# Patient Record
Sex: MALE | Race: OTHER | NOT HISPANIC OR LATINO | Employment: STUDENT | URBAN - METROPOLITAN AREA
[De-identification: names, ages, dates, MRNs, and addresses within clinical notes are randomized per-mention and may not be internally consistent; named-entity substitution may affect disease eponyms.]

---

## 2024-08-28 PROCEDURE — 99283 EMERGENCY DEPT VISIT LOW MDM: CPT

## 2024-08-29 ENCOUNTER — HOSPITAL ENCOUNTER (INPATIENT)
Facility: HOSPITAL | Age: 24
LOS: 2 days | Discharge: HOME | DRG: 440 | End: 2024-08-31
Attending: EMERGENCY MEDICINE | Admitting: INTERNAL MEDICINE
Payer: COMMERCIAL

## 2024-08-29 ENCOUNTER — APPOINTMENT (OUTPATIENT)
Dept: RADIOLOGY | Facility: HOSPITAL | Age: 24
DRG: 440 | End: 2024-08-29
Payer: COMMERCIAL

## 2024-08-29 ENCOUNTER — HOSPITAL ENCOUNTER (EMERGENCY)
Facility: HOSPITAL | Age: 24
Discharge: HOME | DRG: 440 | End: 2024-08-29
Attending: EMERGENCY MEDICINE
Payer: COMMERCIAL

## 2024-08-29 VITALS
HEIGHT: 64 IN | SYSTOLIC BLOOD PRESSURE: 141 MMHG | OXYGEN SATURATION: 99 % | DIASTOLIC BLOOD PRESSURE: 93 MMHG | WEIGHT: 171.96 LBS | RESPIRATION RATE: 18 BRPM | TEMPERATURE: 97 F | BODY MASS INDEX: 29.36 KG/M2 | HEART RATE: 89 BPM

## 2024-08-29 DIAGNOSIS — R10.13 EPIGASTRIC PAIN: ICD-10-CM

## 2024-08-29 DIAGNOSIS — R11.2 NAUSEA AND VOMITING, UNSPECIFIED VOMITING TYPE: ICD-10-CM

## 2024-08-29 DIAGNOSIS — K85.00 IDIOPATHIC ACUTE PANCREATITIS WITHOUT INFECTION OR NECROSIS (HHS-HCC): Primary | ICD-10-CM

## 2024-08-29 DIAGNOSIS — R10.13 EPIGASTRIC PAIN: Primary | ICD-10-CM

## 2024-08-29 LAB
ALBUMIN SERPL BCP-MCNC: 4.4 G/DL (ref 3.4–5)
ALBUMIN SERPL BCP-MCNC: 4.5 G/DL (ref 3.4–5)
ALP SERPL-CCNC: 79 U/L (ref 33–120)
ALP SERPL-CCNC: 81 U/L (ref 33–120)
ALT SERPL W P-5'-P-CCNC: 167 U/L (ref 10–52)
ALT SERPL W P-5'-P-CCNC: 186 U/L (ref 10–52)
ANION GAP SERPL CALC-SCNC: 14 MMOL/L (ref 10–20)
ANION GAP SERPL CALC-SCNC: 15 MMOL/L (ref 10–20)
APTT PPP: 24 SECONDS (ref 27–38)
AST SERPL W P-5'-P-CCNC: 402 U/L (ref 9–39)
AST SERPL W P-5'-P-CCNC: 422 U/L (ref 9–39)
BASOPHILS # BLD AUTO: 0.01 X10*3/UL (ref 0–0.1)
BASOPHILS # BLD AUTO: 0.02 X10*3/UL (ref 0–0.1)
BASOPHILS NFR BLD AUTO: 0.1 %
BASOPHILS NFR BLD AUTO: 0.1 %
BILIRUB SERPL-MCNC: 0.7 MG/DL (ref 0–1.2)
BILIRUB SERPL-MCNC: 0.8 MG/DL (ref 0–1.2)
BUN SERPL-MCNC: 7 MG/DL (ref 6–23)
BUN SERPL-MCNC: 9 MG/DL (ref 6–23)
CALCIUM SERPL-MCNC: 9.4 MG/DL (ref 8.6–10.6)
CALCIUM SERPL-MCNC: 9.5 MG/DL (ref 8.6–10.6)
CHLORIDE SERPL-SCNC: 101 MMOL/L (ref 98–107)
CHLORIDE SERPL-SCNC: 102 MMOL/L (ref 98–107)
CO2 SERPL-SCNC: 22 MMOL/L (ref 21–32)
CO2 SERPL-SCNC: 27 MMOL/L (ref 21–32)
CREAT SERPL-MCNC: 0.72 MG/DL (ref 0.5–1.3)
CREAT SERPL-MCNC: 0.76 MG/DL (ref 0.5–1.3)
EGFRCR SERPLBLD CKD-EPI 2021: >90 ML/MIN/1.73M*2
EGFRCR SERPLBLD CKD-EPI 2021: >90 ML/MIN/1.73M*2
EOSINOPHIL # BLD AUTO: 0.02 X10*3/UL (ref 0–0.7)
EOSINOPHIL # BLD AUTO: 0.07 X10*3/UL (ref 0–0.7)
EOSINOPHIL NFR BLD AUTO: 0.1 %
EOSINOPHIL NFR BLD AUTO: 0.6 %
ERYTHROCYTE [DISTWIDTH] IN BLOOD BY AUTOMATED COUNT: 12.6 % (ref 11.5–14.5)
ERYTHROCYTE [DISTWIDTH] IN BLOOD BY AUTOMATED COUNT: 12.7 % (ref 11.5–14.5)
ETHANOL SERPL-MCNC: <10 MG/DL
GLUCOSE SERPL-MCNC: 106 MG/DL (ref 74–99)
GLUCOSE SERPL-MCNC: 121 MG/DL (ref 74–99)
HAV AB SER QL IA: REACTIVE
HBV CORE AB SER QL: NONREACTIVE
HBV CORE IGM SER QL: NONREACTIVE
HBV SURFACE AB SER-ACNC: >1000 MIU/ML
HBV SURFACE AG SERPL QL IA: NONREACTIVE
HCT VFR BLD AUTO: 43.9 % (ref 41–52)
HCT VFR BLD AUTO: 46.4 % (ref 41–52)
HCV AB SER QL: NONREACTIVE
HGB BLD-MCNC: 15.4 G/DL (ref 13.5–17.5)
HGB BLD-MCNC: 16.3 G/DL (ref 13.5–17.5)
IMM GRANULOCYTES # BLD AUTO: 0.05 X10*3/UL (ref 0–0.7)
IMM GRANULOCYTES # BLD AUTO: 0.06 X10*3/UL (ref 0–0.7)
IMM GRANULOCYTES NFR BLD AUTO: 0.4 % (ref 0–0.9)
IMM GRANULOCYTES NFR BLD AUTO: 0.4 % (ref 0–0.9)
INR PPP: 1.1 (ref 0.9–1.1)
LIPASE SERPL-CCNC: 18 U/L (ref 9–82)
LIPASE SERPL-CCNC: 3834 U/L (ref 9–82)
LYMPHOCYTES # BLD AUTO: 0.95 X10*3/UL (ref 1.2–4.8)
LYMPHOCYTES # BLD AUTO: 1.12 X10*3/UL (ref 1.2–4.8)
LYMPHOCYTES NFR BLD AUTO: 7.1 %
LYMPHOCYTES NFR BLD AUTO: 9.1 %
MCH RBC QN AUTO: 29.1 PG (ref 26–34)
MCH RBC QN AUTO: 29.5 PG (ref 26–34)
MCHC RBC AUTO-ENTMCNC: 35.1 G/DL (ref 32–36)
MCHC RBC AUTO-ENTMCNC: 35.1 G/DL (ref 32–36)
MCV RBC AUTO: 83 FL (ref 80–100)
MCV RBC AUTO: 84 FL (ref 80–100)
MONOCYTES # BLD AUTO: 0.72 X10*3/UL (ref 0.1–1)
MONOCYTES # BLD AUTO: 0.74 X10*3/UL (ref 0.1–1)
MONOCYTES NFR BLD AUTO: 5.4 %
MONOCYTES NFR BLD AUTO: 6 %
NEUTROPHILS # BLD AUTO: 10.3 X10*3/UL (ref 1.2–7.7)
NEUTROPHILS # BLD AUTO: 11.63 X10*3/UL (ref 1.2–7.7)
NEUTROPHILS NFR BLD AUTO: 83.8 %
NEUTROPHILS NFR BLD AUTO: 86.9 %
NRBC BLD-RTO: 0 /100 WBCS (ref 0–0)
NRBC BLD-RTO: 0 /100 WBCS (ref 0–0)
PLATELET # BLD AUTO: 282 X10*3/UL (ref 150–450)
PLATELET # BLD AUTO: 287 X10*3/UL (ref 150–450)
POTASSIUM SERPL-SCNC: 4.2 MMOL/L (ref 3.5–5.3)
POTASSIUM SERPL-SCNC: 4.2 MMOL/L (ref 3.5–5.3)
PROT SERPL-MCNC: 7 G/DL (ref 6.4–8.2)
PROT SERPL-MCNC: 7.5 G/DL (ref 6.4–8.2)
PROTHROMBIN TIME: 11.9 SECONDS (ref 9.8–12.8)
RBC # BLD AUTO: 5.3 X10*6/UL (ref 4.5–5.9)
RBC # BLD AUTO: 5.52 X10*6/UL (ref 4.5–5.9)
SODIUM SERPL-SCNC: 135 MMOL/L (ref 136–145)
SODIUM SERPL-SCNC: 138 MMOL/L (ref 136–145)
WBC # BLD AUTO: 12.3 X10*3/UL (ref 4.4–11.3)
WBC # BLD AUTO: 13.4 X10*3/UL (ref 4.4–11.3)

## 2024-08-29 PROCEDURE — 82077 ASSAY SPEC XCP UR&BREATH IA: CPT

## 2024-08-29 PROCEDURE — 36415 COLL VENOUS BLD VENIPUNCTURE: CPT | Performed by: EMERGENCY MEDICINE

## 2024-08-29 PROCEDURE — 85610 PROTHROMBIN TIME: CPT

## 2024-08-29 PROCEDURE — 86706 HEP B SURFACE ANTIBODY: CPT

## 2024-08-29 PROCEDURE — 86704 HEP B CORE ANTIBODY TOTAL: CPT

## 2024-08-29 PROCEDURE — 86707 HEPATITIS BE ANTIBODY: CPT

## 2024-08-29 PROCEDURE — 36415 COLL VENOUS BLD VENIPUNCTURE: CPT | Performed by: PHYSICIAN ASSISTANT

## 2024-08-29 PROCEDURE — 87350 HEPATITIS BE AG IA: CPT

## 2024-08-29 PROCEDURE — 86708 HEPATITIS A ANTIBODY: CPT

## 2024-08-29 PROCEDURE — 2500000004 HC RX 250 GENERAL PHARMACY W/ HCPCS (ALT 636 FOR OP/ED)

## 2024-08-29 PROCEDURE — 83690 ASSAY OF LIPASE: CPT | Performed by: EMERGENCY MEDICINE

## 2024-08-29 PROCEDURE — 80321 ALCOHOLS BIOMARKERS 1OR 2: CPT

## 2024-08-29 PROCEDURE — 86803 HEPATITIS C AB TEST: CPT

## 2024-08-29 PROCEDURE — 85025 COMPLETE CBC W/AUTO DIFF WBC: CPT | Performed by: EMERGENCY MEDICINE

## 2024-08-29 PROCEDURE — 74177 CT ABD & PELVIS W/CONTRAST: CPT | Mod: FOREIGN READ | Performed by: RADIOLOGY

## 2024-08-29 PROCEDURE — 86705 HEP B CORE ANTIBODY IGM: CPT

## 2024-08-29 PROCEDURE — 85025 COMPLETE CBC W/AUTO DIFF WBC: CPT | Performed by: PHYSICIAN ASSISTANT

## 2024-08-29 PROCEDURE — 99285 EMERGENCY DEPT VISIT HI MDM: CPT | Performed by: EMERGENCY MEDICINE

## 2024-08-29 PROCEDURE — 2500000005 HC RX 250 GENERAL PHARMACY W/O HCPCS: Performed by: PHYSICIAN ASSISTANT

## 2024-08-29 PROCEDURE — 99285 EMERGENCY DEPT VISIT HI MDM: CPT

## 2024-08-29 PROCEDURE — 74177 CT ABD & PELVIS W/CONTRAST: CPT

## 2024-08-29 PROCEDURE — 2550000001 HC RX 255 CONTRASTS: Performed by: EMERGENCY MEDICINE

## 2024-08-29 PROCEDURE — 87340 HEPATITIS B SURFACE AG IA: CPT

## 2024-08-29 PROCEDURE — 99223 1ST HOSP IP/OBS HIGH 75: CPT

## 2024-08-29 PROCEDURE — 2500000005 HC RX 250 GENERAL PHARMACY W/O HCPCS

## 2024-08-29 PROCEDURE — 80053 COMPREHEN METABOLIC PANEL: CPT | Performed by: EMERGENCY MEDICINE

## 2024-08-29 PROCEDURE — 2500000004 HC RX 250 GENERAL PHARMACY W/ HCPCS (ALT 636 FOR OP/ED): Performed by: PHYSICIAN ASSISTANT

## 2024-08-29 PROCEDURE — 80053 COMPREHEN METABOLIC PANEL: CPT | Performed by: PHYSICIAN ASSISTANT

## 2024-08-29 PROCEDURE — 83690 ASSAY OF LIPASE: CPT | Performed by: PHYSICIAN ASSISTANT

## 2024-08-29 PROCEDURE — 1100000001 HC PRIVATE ROOM DAILY

## 2024-08-29 RX ORDER — ACETAMINOPHEN 325 MG/1
975 TABLET ORAL EVERY 8 HOURS PRN
Status: DISCONTINUED | OUTPATIENT
Start: 2024-08-29 | End: 2024-08-30

## 2024-08-29 RX ORDER — POLYETHYLENE GLYCOL 3350 17 G/17G
17 POWDER, FOR SOLUTION ORAL DAILY PRN
Status: DISCONTINUED | OUTPATIENT
Start: 2024-08-29 | End: 2024-08-31 | Stop reason: HOSPADM

## 2024-08-29 RX ORDER — POLYETHYLENE GLYCOL 3350 17 G/17G
17 POWDER, FOR SOLUTION ORAL DAILY
Status: DISCONTINUED | OUTPATIENT
Start: 2024-08-29 | End: 2024-08-29

## 2024-08-29 RX ORDER — ONDANSETRON HYDROCHLORIDE 2 MG/ML
4 INJECTION, SOLUTION INTRAVENOUS EVERY 6 HOURS PRN
Status: DISCONTINUED | OUTPATIENT
Start: 2024-08-29 | End: 2024-08-31 | Stop reason: HOSPADM

## 2024-08-29 SDOH — SOCIAL STABILITY: SOCIAL INSECURITY: HAVE YOU HAD ANY THOUGHTS OF HARMING ANYONE ELSE?: NO

## 2024-08-29 SDOH — SOCIAL STABILITY: SOCIAL INSECURITY: DO YOU FEEL ANYONE HAS EXPLOITED OR TAKEN ADVANTAGE OF YOU FINANCIALLY OR OF YOUR PERSONAL PROPERTY?: NO

## 2024-08-29 SDOH — SOCIAL STABILITY: SOCIAL INSECURITY: HAS ANYONE EVER THREATENED TO HURT YOUR FAMILY OR YOUR PETS?: NO

## 2024-08-29 SDOH — SOCIAL STABILITY: SOCIAL INSECURITY: ABUSE: ADULT

## 2024-08-29 SDOH — SOCIAL STABILITY: SOCIAL INSECURITY: DO YOU FEEL UNSAFE GOING BACK TO THE PLACE WHERE YOU ARE LIVING?: NO

## 2024-08-29 SDOH — SOCIAL STABILITY: SOCIAL INSECURITY: ARE YOU OR HAVE YOU BEEN THREATENED OR ABUSED PHYSICALLY, EMOTIONALLY, OR SEXUALLY BY ANYONE?: NO

## 2024-08-29 SDOH — SOCIAL STABILITY: SOCIAL INSECURITY: HAVE YOU HAD THOUGHTS OF HARMING ANYONE ELSE?: YES

## 2024-08-29 SDOH — SOCIAL STABILITY: SOCIAL INSECURITY: ARE THERE ANY APPARENT SIGNS OF INJURIES/BEHAVIORS THAT COULD BE RELATED TO ABUSE/NEGLECT?: NO

## 2024-08-29 SDOH — SOCIAL STABILITY: SOCIAL INSECURITY: DOES ANYONE TRY TO KEEP YOU FROM HAVING/CONTACTING OTHER FRIENDS OR DOING THINGS OUTSIDE YOUR HOME?: NO

## 2024-08-29 SDOH — SOCIAL STABILITY: SOCIAL INSECURITY: WERE YOU ABLE TO COMPLETE ALL THE BEHAVIORAL HEALTH SCREENINGS?: YES

## 2024-08-29 ASSESSMENT — PAIN DESCRIPTION - LOCATION
LOCATION: ABDOMEN
LOCATION: ABDOMEN

## 2024-08-29 ASSESSMENT — ENCOUNTER SYMPTOMS
ABDOMINAL PAIN: 1
BLOOD IN STOOL: 0
DYSURIA: 0
DIZZINESS: 0
SHORTNESS OF BREATH: 1
FEVER: 0
FATIGUE: 1
CHEST TIGHTNESS: 0
COUGH: 1
HEADACHES: 0
NAUSEA: 1
CONSTIPATION: 0
DIARRHEA: 1
DIAPHORESIS: 1
UNEXPECTED WEIGHT CHANGE: 1
CHILLS: 0
LIGHT-HEADEDNESS: 0
SORE THROAT: 0
ACTIVITY CHANGE: 0
VOMITING: 1

## 2024-08-29 ASSESSMENT — COGNITIVE AND FUNCTIONAL STATUS - GENERAL
PATIENT BASELINE BEDBOUND: NO
DAILY ACTIVITIY SCORE: 24
MOBILITY SCORE: 24

## 2024-08-29 ASSESSMENT — PATIENT HEALTH QUESTIONNAIRE - PHQ9
SUM OF ALL RESPONSES TO PHQ9 QUESTIONS 1 & 2: 0
1. LITTLE INTEREST OR PLEASURE IN DOING THINGS: NOT AT ALL
2. FEELING DOWN, DEPRESSED OR HOPELESS: NOT AT ALL

## 2024-08-29 ASSESSMENT — ACTIVITIES OF DAILY LIVING (ADL)
LACK_OF_TRANSPORTATION: NO
WALKS IN HOME: INDEPENDENT
HEARING - RIGHT EAR: FUNCTIONAL
BATHING: INDEPENDENT
HEARING - LEFT EAR: FUNCTIONAL
JUDGMENT_ADEQUATE_SAFELY_COMPLETE_DAILY_ACTIVITIES: YES
DRESSING YOURSELF: INDEPENDENT
ADEQUATE_TO_COMPLETE_ADL: YES
PATIENT'S MEMORY ADEQUATE TO SAFELY COMPLETE DAILY ACTIVITIES?: YES
TOILETING: INDEPENDENT
FEEDING YOURSELF: INDEPENDENT
GROOMING: INDEPENDENT

## 2024-08-29 ASSESSMENT — COLUMBIA-SUICIDE SEVERITY RATING SCALE - C-SSRS

## 2024-08-29 ASSESSMENT — PAIN - FUNCTIONAL ASSESSMENT: PAIN_FUNCTIONAL_ASSESSMENT: 0-10

## 2024-08-29 ASSESSMENT — LIFESTYLE VARIABLES
SKIP TO QUESTIONS 9-10: 1
AUDIT-C TOTAL SCORE: 0
EVER HAD A DRINK FIRST THING IN THE MORNING TO STEADY YOUR NERVES TO GET RID OF A HANGOVER: NO
AUDIT-C TOTAL SCORE: 0
HAVE YOU EVER FELT YOU SHOULD CUT DOWN ON YOUR DRINKING: NO
HOW OFTEN DO YOU HAVE A DRINK CONTAINING ALCOHOL: NEVER
EVER FELT BAD OR GUILTY ABOUT YOUR DRINKING: NO
HOW MANY STANDARD DRINKS CONTAINING ALCOHOL DO YOU HAVE ON A TYPICAL DAY: PATIENT DOES NOT DRINK
TOTAL SCORE: 0
HAVE PEOPLE ANNOYED YOU BY CRITICIZING YOUR DRINKING: NO
HOW OFTEN DO YOU HAVE 6 OR MORE DRINKS ON ONE OCCASION: NEVER

## 2024-08-29 ASSESSMENT — PAIN SCALES - GENERAL
PAINLEVEL_OUTOF10: 0 - NO PAIN
PAINLEVEL_OUTOF10: 7
PAINLEVEL_OUTOF10: 4
PAINLEVEL_OUTOF10: 7

## 2024-08-29 ASSESSMENT — PAIN DESCRIPTION - PAIN TYPE
TYPE: ACUTE PAIN
TYPE: ACUTE PAIN

## 2024-08-29 ASSESSMENT — PAIN DESCRIPTION - DESCRIPTORS
DESCRIPTORS: DULL
DESCRIPTORS: SHARP

## 2024-08-29 NOTE — ED PROVIDER NOTES
HPI   Chief Complaint   Patient presents with    Abdominal Pain    Vomiting       HPI  Patient is a 23-year-old no past medical history presenting with abdominal pain and vomiting.  Patient said on Monday, he started working out for the first time.  He did some abs and back workout.  The next day he woke up with abdominal pain, nausea and vomiting.  Patient has since vomited 2 more times.  Patient vomited this morning when he tried to eat some bananas.  He then vomited again in the afternoon.  Since he vomited patient abdominal pain have subsided.  Patient denies any fever or binge drinking.  Patient denies any fever, and chills.      Patient History   No past medical history on file.  No past surgical history on file.  No family history on file.  Social History     Tobacco Use    Smoking status: Not on file    Smokeless tobacco: Not on file   Substance Use Topics    Alcohol use: Not on file    Drug use: Not on file       Physical Exam   ED Triage Vitals [08/29/24 0024]   Temperature Heart Rate Respirations BP   36.1 °C (97 °F) 89 18 (!) 141/93      Pulse Ox Temp Source Heart Rate Source Patient Position   99 % Temporal -- --      BP Location FiO2 (%)     -- --       Physical Exam  Constitutional:       Appearance: He is well-developed.   HENT:      Head: Normocephalic.   Cardiovascular:      Rate and Rhythm: Normal rate and regular rhythm.      Heart sounds: Normal heart sounds.   Pulmonary:      Effort: Pulmonary effort is normal.   Abdominal:      General: Abdomen is flat. Bowel sounds are normal.      Palpations: Abdomen is soft.   Neurological:      General: No focal deficit present.      Mental Status: He is alert and oriented to person, place, and time.               Medical Decision Making  Patient is a 23-year-old no past medical history presenting with abdominal pain and vomiting.  The differential diagnoses considered for this patient were cholelithiasis, pancreatitis, appendicitis and gastritis.  At  bedside patient was hemodynamically stable and reserved.  On physical exam, patient did not have any tenderness, guarding or rebound tenderness.  Patient was able to tolerate p.o. and felt better being given the Magic mouth.  Appendicitis is less likely because patient was not endorsing any right lower quadrant pain.  His pain was mild.  Patient also did not have any systematic symptoms.  Patient does state that his vomiting was associated with mild epigastric pain.  As of right now he is not endorsing any epigastric pain after giving the Magic mouth.  Since patient was able to tolerate p.o. without any complication, patient was then discharged.  Patient was given a return precaution.  Patient was told to return if the vomiting and epigastric pain continue.  I educated patient about pancreatitis.    Procedure  Procedures     Jersey Douglas MD  Resident  08/29/24 1841       Jersey Douglas MD  Resident  08/29/24 1845      Diagnoses as of 09/01/24 1016   Epigastric pain     ATTENDING ATTESTATION:  The patient was seen by the resident/fellow. I have personally performed a substantive portion of the encounter. I have seen and examined the patient; agree with the workup, evaluation, MDM, management and diagnosis. The care plan has been discussed with the resident; I have reviewed the resident’s note and agree with the documented findings with any additions/exceptions as below.    Patient tolerating PO and without tenderness or reported symptoms on my exam.    Dona Patterson DO  ED Attending       Dona Patterson DO  09/01/24 [id number]

## 2024-08-29 NOTE — HOSPITAL COURSE
Lamberto Olea is a 23 y.o. male with no significant past medical history who presented to [facility] [place] for complaints of epigastric abdominal pain, nausea, vomiting, and loose stools over the past 2 days. Patient was recently evaluated at [facility] ED yesterday for similar complaints. Patient is having persistent epigastric pain upon palpation and with food/liquids.  Labs significant for slight leukocytosis, significant increase in lipase from 18 to 3000s, and transaminitis with AST an 400s and ALT in 100s. CT abdomen pelvis unremarkable. Acute pancreatitis remains high on the differential diagnosis. AST>ALT 2:1 might indicate an alcoholic etiology, however, patient denying alcohol use. No known family history of pancreatitis. No history of gallstones or previous episodes of pancreatitis. Obtained RUQ US, unremarkable and without stone present. Repeat lipase 64. AST/ALT downtrending. Ruled out hypertriglyceridemia. At this time, etiology is possibly idiopathic (possibly viral) vs. gallstone that has now been cleared.    TO DO:  [X] Schedule outpatient follow-up with Krissy

## 2024-08-29 NOTE — SIGNIFICANT EVENT
Please refer to Dr. Lutz's note for detailed excellent HPI    CC  Abdominal pain  Nausea and vomiting    HPI    Lamberto Olea, a 23 year old male with no significant PMHx presenting to the ED with complaints of worsening epigastric pain, n/v of 2 days duration.    According to the patient he had presented to the ED last night with same complaints which had started 2 days prior but worsened in intensity with ~6-7 episodes of non bilious vomiting. Abd pain was said to occasionally radiate to the back, 7/10, aggravated by meals. In the ED his lipase was 16. He was given magic mouth wash, felt better and was discharged home. However he presents again today with same complaints and an episode of non bilious vomiting. Abd pain said to be triggered by intake of food and water. On this recent presentation in the ED, he had elevated BP, lipase >3000, CT abd pelvis ordered. He was given a IVF 1L with improvement in his pain.  Due to persistent symptoms and elevated lipase, he was admitted to  for further management.     On encounter in the ED, he endorsed marked improvement in abdominal pain, although he is afraid to eat because he thinks it may trigger a new episode. He feels he has lost some weight due to poor PO intake and noticed blood on the toilet paper when he wiped himself earlier. He however denies any any nausea, vomiting, light headedness, fever, chills, chest pain, SOB, cough or change in urinary habit.  He recently arrived from [facility] (8/1/2024) to commence fall classes at [employer]. He denies any recent contact with a sick individual, use of supplements, alcohol (one drink in the past year) or unprescribed medications drugs. He occasionally takes tylenol for headaches. He denies any trauma, viral infection, or recent blood transfusion. He is said to have had a fever of unknown origin 3-4 years ago in Located within [facility], he was admitted, felt better and discharged after a couple of days.     In the ED    Vitals:  T  36.5, HR 81, /97, RR 81, SpO2 97% on RA    Labs:  WBC 13.4, Hgb 16.3, plt 287  Na 138, K 4.2, Cl 101, HCO3 27, BUN 9, Cr 0.78, glu 106  Ca 9.4, tprot 7.5, alb 4.5, alkphos 79, <--402, <--167, tbili 0.7  Lipase 3834ß-18 (8/29)     Imaging:  CXR 8/29:  - Unremarkable CT scan the abdomen and pelvis.  - No acute findings.    PMH: As above  SocHx: He does not smoke or use illicit drugs. He does not drink alcohol. He is a student of [employer] and reside on campus. He is independent with his ADL   FamHx: No family history of liver or pancreatic disorders    Home Medications  - Not on any medications at home     Physical exam:    Constitutional: Lying calmly in bed. Alert, no acute distress, cooperative  HEENT: Normocephalic, atraumatic, PERRLA, EOMI, moist mucous membranes, no pharyngeal erythema  Cardiovascular: RRR, normal S1/S2, no murmurs noted  Pulmonary: Clear to auscultation b/l, no wheezes/crackles/rhonchi, no increased work of breathing, no supplemental oxygen  GI: Soft, non-tender, non-distended, normoactive bowel sounds  : No gaona catheter  Lower extremities: Warm and well perfused, no lower extremity edema  Neuro: A&O x3, able to move all 4 extremities spontaneously  Psych: Appropriate mood and affect     Assessment and Plan  23 year old male with no significant PMHx presenting to the ED with complaints of worsening epigastric pain, n/v of 2 days duration. Except for elevated BP, HDS on presentation. Lipase >3000, elevated AST/ALT (422/186), leukocytosis CT A/P unremarkable. Presentation concerning for acute pancreatitis (2 out of 3 criteria). Exact etiology unclear at this time, likely idiopathic vs alcoholic (although patient denies alcohol use but AST/ALT fit alcoholic pattern). Less likely due to gall stone vs familial Hypertriglyceridemia at this time. We will continue rehydration, pain control, start oral feeds once patient can tolerate PO intake and evaluate further.        #Acute Pancreatitis   - likely idiopathic vs alcohol induced   - CT A/P: unremarkable  - s/p 1L IVF in the ED  - Pain much improved on encounter in the ED  Plan  - IVF 1L LR. Will hold off on maintenance fluid for now given marked improvement in abd pain  - Oxycodon 5mg q6 PRN   - Can give IV Dilaudid 0.2 mg q4 PRN for break through pain  - NPO for now pending resolution of pain  - Will order lipid panel  - IV Zofran 4 mg q6 PRN    #Transaminitis (Hepatocellular pattern)  - On admission: <--402, <--167  - Patient denies alcohol use   Plan  - We will order alcohol, PETH, Coags, Mg  - will order tylenol level  - we will continue to monitor for now    #Leukocytosis  - likely reactive 13.4. No concern for infection at this time  Plan  - We will continue to monitor for now    F: PRN  E: Replete PRN  N: NPO for now pending when he can tolerate diet   A: PIV  DVT ppx: SCD/Encourage ambulation  GI ppx: None    Code Status: Full code  (confirmed on Admission)  Surrogate Medical Decision-maker: Eagle (room mate): [phone]    Kavitha Gaitan MD MPH

## 2024-08-29 NOTE — ED PROVIDER NOTES
HPI   Chief Complaint   Patient presents with    Abdominal Pain    Vomiting    Nausea       HPI    Patient is a 23-year-old male with no significant past medical history that presents to the ED for evaluation of abdominal pain and vomiting x2 days.  Patient was seen in the ED last night for similar complaints, where he was given Magic mouthwash and felt better.  Left in stable condition.  He reports he went to sleep last night and tried to eat this morning, then the abdominal pain returned and he had 1 episode of nonbloody emesis.  Abdominal pain is mostly epigastric, but also extends to lower abdominal pain.  Feels better after emesis.  Patient also endorses 1 episode of stool with blood, endorses straining and no blood mixed into stool.  Denies fever, chills, dysuria, frequency, dizziness, lightheadedness, shortness of breath, chest pain.  Patient arrives from [facility] on 8/1/2024.  Denies taking any medications or excessive alcohol use.  No new foods.     Visit from last night reviewed.  Patient had a white blood cell count of 12.3.  An elevated AST and ALT.       Patient History   History reviewed. No pertinent past medical history.  History reviewed. No pertinent surgical history.  No family history on file.  Social History     Tobacco Use    Smoking status: Not on file    Smokeless tobacco: Not on file   Substance Use Topics    Alcohol use: Not on file    Drug use: Not on file       Physical Exam   ED Triage Vitals [08/29/24 1139]   Temperature Heart Rate Respirations BP   36.5 °C (97.7 °F) 81 20 (!) 137/97      Pulse Ox Temp Source Heart Rate Source Patient Position   97 % Oral Monitor Sitting      BP Location FiO2 (%)     Right arm --       Physical Exam  Constitutional:       General: He is not in acute distress.     Appearance: He is well-developed.   HENT:      Head: Normocephalic and atraumatic.      Mouth/Throat:      Mouth: Mucous membranes are moist.      Pharynx: No oropharyngeal exudate.   Eyes:       General: No scleral icterus.  Cardiovascular:      Rate and Rhythm: Normal rate and regular rhythm.      Heart sounds: Normal heart sounds. No murmur heard.  Pulmonary:      Effort: Pulmonary effort is normal. No respiratory distress.      Breath sounds: Normal breath sounds. No stridor. No wheezing, rhonchi or rales.   Abdominal:      General: Abdomen is flat. There is no distension. There are no signs of injury.      Palpations: Abdomen is soft. There is no shifting dullness, hepatomegaly, splenomegaly or mass.      Tenderness: There is abdominal tenderness in the right upper quadrant, epigastric area, periumbilical area and left lower quadrant. There is no right CVA tenderness, left CVA tenderness, guarding or rebound.   Skin:     General: Skin is warm and dry.      Coloration: Skin is not jaundiced or pale.      Findings: No rash.   Neurological:      General: No focal deficit present.      Mental Status: He is alert and oriented to person, place, and time.   Psychiatric:         Mood and Affect: Mood normal.         Behavior: Behavior normal.           ED Course & MDM   Diagnoses as of 08/29/24 1504   Epigastric pain   Nausea and vomiting, unspecified vomiting type                 No data recorded     Randa Coma Scale Score: 15 (08/29/24 1141 : Roseanna Dumont RN)                           Medical Decision Making  Patient is a 23-year-old male with no significant past medical history that presents to the ED for evaluation of abdominal pain and vomiting x2 days.  On exam patient is uncomfortable-appearing in no acute distress.  Patient has epigastric and generalized lower abdominal pain on exam.  Differential includes but is not limited to gastritis, PUD, cholecystitis, biliary disease, pancreatitis, colitis.  Patient staffed with ED attending physician    Visit from 8/29/2024 (last night) reviewed.  Abdominal labs at that time revealed a WBC of 12.3.  And an ALT of 167, .     Labs are ordered.  CBC with  WBC of 13.4. CT abdomen pelvis reveals no acute process.  Lipase is elevated at 3834, compared to value of 18 yesterday.  IV fluids started.     Patient's pain is managed in the ED with Magic mouthwash.  Patient still is unable to drink anything without reproducing his symptoms.  He is not established with primary care or GI.    After discussion with the ED attending physician, the disposition of admission is appropriate. Patient meets requirements for admission.  [facility] admission coordinators were contacted and patient was accepted to GI service [facility].  Patient boarding in CDU until bed assignment.       Procedure  Procedures     Lamont Mcconnell PA-C  08/29/24 8492

## 2024-08-29 NOTE — H&P
Medicine History and Physical    Chief Complaint   Patient presents with    Abdominal Pain    Vomiting    Nausea       Subjective    History Of Present Illness  Lamberto Olea is a 23 y.o. male who presented to [facility] for complaints of upper abdominal pain with nausea, vomiting, and diarrhea.    Patient reports having epigastric abdominal pain which radiates to the lower abdomen. This pain began on Tuesday with 4/5-10 manage pain and had worsened since that time. Denies having this pain previously. This pain is relieved with emesis. He has had multiple episodes of nonbloody emesis over the past few days. States that the pain worsens with any food or liquids. He has also had a few episodes of loose stools with a small amount of blood found on wiping 1 time. Denying any constitutional symptoms such as fever and chills. No night sweats. Endorses weight loss over the past few days after decreased p.o. intake. No CP/SOB.    Patient was recently seen at [facility] ED 8/28 for similar complaints. He was given Magic mouthwash and had improved pain after. Labs at that time were significant for WBC 12.3, however, the abdominal pain returned this morning after eating breakfast.    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 He does not take any medications or supplements. Denies alcohol use. States that he has only consumed alcohol 1 time in his life. Denies illicit drug use. No tobacco use. Denies any other PMH or surgical history. States that he has only been in a hospital 1 time 3 to 4 years ago in [place] after a fever of unknown origin. States that workup at that hospital was inconclusive and he was discharged after fever resolved after a few days of admission. Denies any family history of GI issues. States that he had 1 aunt with an unknown abdominal surgery in the past. Denies history of hepatitis.    In the ED, patient was found to have persistent symptoms including abdominal pain, nausea,  vomiting. Lipase during previous ED visit was 18, now increased to 3,834. , . WBC 13. CT A/P negative for acute findings. S/p 1 L fluids.      ED Triage Vitals [08/29/24 1139]   Temperature Heart Rate Respirations BP   36.5 °C (97.7 °F) 81 20 (!) 137/97      Pulse Ox Temp Source Heart Rate Source Patient Position   97 % Oral Monitor Sitting      BP Location FiO2 (%)     Right arm --          Medications   polyethylene glycol (Glycolax, Miralax) packet 17 g (has no administration in time range)   lactated Ringer's bolus 1,000 mL (has no administration in time range)   ondansetron (Zofran) injection 4 mg (has no administration in time range)   acetaminophen (Tylenol) tablet 975 mg (has no administration in time range)   lidocaine-diphenhydrAMINE-Maalox 1:1:1 Magic Mouthwash (10 mL Swish & Swallow Given 8/29/24 1246)   iohexol (OMNIPaque) 350 mg iodine/mL solution 75 mL (75 mL intravenous Given 8/29/24 1254)   sodium chloride 0.9 % bolus 1,000 mL (0 mL intravenous Stopped 8/29/24 1733)       ED Medication Administration from 08/29/2024 1122 to 08/29/2024 1807         Date/Time Order Dose Route Action Action by     08/29/2024 1246 EDT lidocaine-diphenhydrAMINE-Maalox 1:1:1 Magic Mouthwash 10 mL Swish & Swallow Given PERRI German     08/29/2024 1254 EDT iohexol (OMNIPaque) 350 mg iodine/mL solution 75 mL 75 mL intravenous Given HENRY Jimenez     08/29/2024 1448 EDT sodium chloride 0.9 % bolus 1,000 mL 1,000 mL intravenous New Bag PERRI German     08/29/2024 1610 EDT polyethylene glycol (Glycolax, Miralax) packet 17 g 17 g oral Given PERRI German     08/29/2024 1612 EDT polyethylene glycol (Glycolax, Miralax) packet 17 g 17 g oral Not Given PERRI German     08/29/2024 1733 EDT sodium chloride 0.9 % bolus 1,000 mL 0 mL intravenous Stopped PERRI German             Review of Systems   Constitutional:  Positive for diaphoresis, fatigue and unexpected weight change. Negative for activity change, chills and fever.   HENT:   Negative for congestion, sneezing and sore throat.    Respiratory:  Positive for cough and shortness of breath. Negative for chest tightness.    Cardiovascular:  Negative for chest pain and leg swelling.   Gastrointestinal:  Positive for abdominal pain, diarrhea, nausea and vomiting. Negative for blood in stool and constipation.   Genitourinary:  Negative for dysuria and urgency.   Skin:  Negative for rash.   Neurological:  Negative for dizziness, light-headedness and headaches.       Past Medical History  Denies any PMH. States that he has only been in a hospital 1 time 3 to 4 years ago in [facility] after a fever of unknown origin. States that workup at that hospital was inconclusive and he was discharged after fever resolved after a few days of admission. Denies history of hepatitis.    Surgical History  Denies surgical history.     Social History  Denies alcohol use. States that he has only consumed alcohol 1 time in his life. Denies illicit drug use. No tobacco use.     Family History  Denies any family history of GI issues. States that he had 1 aunt with an unknown abdominal surgery in the past.      Allergies  Patient has no known allergies.    Objective    Prior to Admission medications    Not on File        Last Recorded Vitals:  Vitals:    08/29/24 1139   BP: (!) 137/97   Pulse: 81   Resp: 20   Temp: 36.5 °C (97.7 °F)   SpO2: 97%         Physical Exam:  Physical Exam  Constitutional:       General: He is not in acute distress.     Appearance: Normal appearance. He is not ill-appearing.      Comments: Appears comfortable.   HENT:      Head: Normocephalic and atraumatic.      Mouth/Throat:      Mouth: Mucous membranes are moist.      Pharynx: Oropharynx is clear.   Eyes:      Conjunctiva/sclera: Conjunctivae normal.   Cardiovascular:      Rate and Rhythm: Normal rate and regular rhythm.      Heart sounds: Normal heart sounds. No murmur heard.     No friction rub. No gallop.   Pulmonary:      Effort: Pulmonary  effort is normal. No respiratory distress.      Breath sounds: Normal breath sounds. No wheezing.   Abdominal:      General: Abdomen is flat. There is no distension.      Palpations: Abdomen is soft. There is no mass.      Tenderness: There is abdominal tenderness (epigastric). There is no guarding or rebound.   Musculoskeletal:         General: No swelling or deformity. Normal range of motion.   Skin:     General: Skin is warm and dry.      Coloration: Skin is not jaundiced.      Findings: No bruising.   Neurological:      General: No focal deficit present.      Mental Status: He is alert and oriented to person, place, and time. Mental status is at baseline.   Psychiatric:         Mood and Affect: Mood normal.         Behavior: Behavior normal.          Labs  Results from last 7 days   Lab Units 08/29/24  1246 08/29/24  0127   WBC AUTO x10*3/uL 13.4* 12.3*   HEMOGLOBIN g/dL 16.3 15.4   HEMATOCRIT % 46.4 43.9   PLATELETS AUTO x10*3/uL 287 282            Results from last 7 days   Lab Units 08/29/24  1246 08/29/24  0127   SODIUM mmol/L 138 135*   POTASSIUM mmol/L 4.2 4.2   CHLORIDE mmol/L 101 102   CO2 mmol/L 27 22   BUN mg/dL 9 7   CREATININE mg/dL 0.76 0.72   CALCIUM mg/dL 9.4 9.5     Results from last 7 days   Lab Units 08/29/24  1246 08/29/24  0127   ALK PHOS U/L 79 81   BILIRUBIN TOTAL mg/dL 0.7 0.8   PROTEIN TOTAL g/dL 7.5 7.0   ALT U/L 186* 167*   AST U/L 422* 402*      Results from last 7 days   Lab Units 08/29/24  1616   APTT seconds 24*   INR  1.1      CT abdomen pelvis w IV contrast   Final Result   Unremarkable CT scan the abdomen and pelvis.   No acute findings.   Signed by Caesar Allen MD           Principal Problem:    Epigastric pain           Assessment/Plan   Lamberto Olea is a 23 y.o. male with no significant past medical history who presented to [facility] for complaints of epigastric abdominal pain, nausea, vomiting, and loose stools over the past 2 days. Patient was recently  evaluated at [facility] ED yesterday for similar complaints. Patient is having persistent epigastric pain upon palpation and with food/liquids. Denying any red flag symptoms such as fevers/chills or chronic weight loss. Labs significant for slight leukocytosis, significant increase in lipase from 18 to 3000s, and transaminitis with AST an 400s and ALT in 100s. CT abdomen pelvis unremarkable. Acute pancreatitis remains high on the differential diagnosis. AST>ALT 2:1 might indicate an alcoholic etiology, however, patient denying alcohol use. No known family history of pancreatitis. No history of gallstones or previous episodes of pancreatitis. More likely idiopathic. Less likely secondary to hypertriglyceridemia.    # Acute pancreatitis likely idiopathic versus less likely hypertriglyceridemia  # Nausea  :: CT abdomen pelvis 8/29 unremarkable in the ED  :: Most common causes of pancreatitis include alcohol and gallstones. Alcohol less likely given patient denying history. Gallstones also less likely given negative CT.  :: S/p 1 L LR in the ED  Obtain lipid panel in a.m.  Pain currently well-controlled. Continue management with Tylenol 975 TID PRN.  Currently n.p.o. given nausea, advance diet as tolerated  Will give another 1 L bolus LR, continue to monitor and reevaluate in the morning  IV Zofran 4 mg q6 hours PRN    # Transaminitis  :: AST>ALT 2:1 might indicate an alcoholic etiology for pancreatitis, however, patient denying alcohol use.   Ordered EtOH level  Ordered PeT    # Leukocytosis  :: Likely reactive no signs and symptoms of infection at this time.  Will continue to monitor    IVF: PRN  Electrolytes: K>4, P>3, Mg>2  Access: PIV    Diet: Adult diet Regular   DVT Ppx: Not indicated, encourage ambulation  GI Ppx: Not indicated  Bowel regimen: MiraLAX PRN  Pain management: Tylenol 975 TID PRN    Emergency Contact: Extended Emergency Contact Information  Primary Emergency Contact:  ERVIN GONZALES  Mobile Phone: [phone]  Relation: Friend  Preferred language: English   needed? No   Code status: Full Code (confirmed on admission)  Disposition: Admitted pending further workup    Shadi Lutz MD   PGY-1 Internal Medicine

## 2024-08-29 NOTE — ED TRIAGE NOTES
Pt returns to the ED, seen last night, with continued upper abdominal pain with nausea, vomiting, and diarrhea.     No major medical conditions. 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

## 2024-08-30 ENCOUNTER — APPOINTMENT (OUTPATIENT)
Dept: RADIOLOGY | Facility: HOSPITAL | Age: 24
DRG: 440 | End: 2024-08-30
Payer: COMMERCIAL

## 2024-08-30 LAB
ALBUMIN SERPL BCP-MCNC: 3.8 G/DL (ref 3.4–5)
ALP SERPL-CCNC: 73 U/L (ref 33–120)
ALT SERPL W P-5'-P-CCNC: 143 U/L (ref 10–52)
ANION GAP SERPL CALC-SCNC: 11 MMOL/L (ref 10–20)
APAP SERPL-MCNC: <10 UG/ML
APTT PPP: 27 SECONDS (ref 27–38)
AST SERPL W P-5'-P-CCNC: 277 U/L (ref 9–39)
BASOPHILS # BLD AUTO: 0.01 X10*3/UL (ref 0–0.1)
BASOPHILS NFR BLD AUTO: 0.1 %
BILIRUB DIRECT SERPL-MCNC: 0.2 MG/DL (ref 0–0.3)
BILIRUB SERPL-MCNC: 0.7 MG/DL (ref 0–1.2)
BUN SERPL-MCNC: 7 MG/DL (ref 6–23)
CALCIUM SERPL-MCNC: 9 MG/DL (ref 8.6–10.6)
CHLORIDE SERPL-SCNC: 102 MMOL/L (ref 98–107)
CHOLEST SERPL-MCNC: 126 MG/DL (ref 0–199)
CHOLESTEROL/HDL RATIO: 4
CO2 SERPL-SCNC: 29 MMOL/L (ref 21–32)
CREAT SERPL-MCNC: 0.78 MG/DL (ref 0.5–1.3)
EGFRCR SERPLBLD CKD-EPI 2021: >90 ML/MIN/1.73M*2
EOSINOPHIL # BLD AUTO: 0.09 X10*3/UL (ref 0–0.7)
EOSINOPHIL NFR BLD AUTO: 1.2 %
ERYTHROCYTE [DISTWIDTH] IN BLOOD BY AUTOMATED COUNT: 12.8 % (ref 11.5–14.5)
GLUCOSE SERPL-MCNC: 95 MG/DL (ref 74–99)
HCT VFR BLD AUTO: 41.6 % (ref 41–52)
HDLC SERPL-MCNC: 31.2 MG/DL
HGB BLD-MCNC: 13.8 G/DL (ref 13.5–17.5)
IMM GRANULOCYTES # BLD AUTO: 0.03 X10*3/UL (ref 0–0.7)
IMM GRANULOCYTES NFR BLD AUTO: 0.4 % (ref 0–0.9)
INR PPP: 1 (ref 0.9–1.1)
LDLC SERPL CALC-MCNC: 78 MG/DL
LIPASE SERPL-CCNC: 64 U/L (ref 9–82)
LYMPHOCYTES # BLD AUTO: 1.76 X10*3/UL (ref 1.2–4.8)
LYMPHOCYTES NFR BLD AUTO: 23.6 %
MAGNESIUM SERPL-MCNC: 1.84 MG/DL (ref 1.6–2.4)
MCH RBC QN AUTO: 29 PG (ref 26–34)
MCHC RBC AUTO-ENTMCNC: 33.2 G/DL (ref 32–36)
MCV RBC AUTO: 87 FL (ref 80–100)
MONOCYTES # BLD AUTO: 0.7 X10*3/UL (ref 0.1–1)
MONOCYTES NFR BLD AUTO: 9.4 %
NEUTROPHILS # BLD AUTO: 4.88 X10*3/UL (ref 1.2–7.7)
NEUTROPHILS NFR BLD AUTO: 65.3 %
NON HDL CHOLESTEROL: 95 MG/DL (ref 0–149)
NRBC BLD-RTO: 0 /100 WBCS (ref 0–0)
PHOSPHATE SERPL-MCNC: 2.8 MG/DL (ref 2.5–4.9)
PLATELET # BLD AUTO: 237 X10*3/UL (ref 150–450)
POTASSIUM SERPL-SCNC: 4.1 MMOL/L (ref 3.5–5.3)
PROT SERPL-MCNC: 6.4 G/DL (ref 6.4–8.2)
PROTHROMBIN TIME: 11.7 SECONDS (ref 9.8–12.8)
RBC # BLD AUTO: 4.76 X10*6/UL (ref 4.5–5.9)
SODIUM SERPL-SCNC: 138 MMOL/L (ref 136–145)
TRIGL SERPL-MCNC: 82 MG/DL (ref 0–149)
VLDL: 16 MG/DL (ref 0–40)
WBC # BLD AUTO: 7.5 X10*3/UL (ref 4.4–11.3)

## 2024-08-30 PROCEDURE — 84100 ASSAY OF PHOSPHORUS: CPT

## 2024-08-30 PROCEDURE — 76705 ECHO EXAM OF ABDOMEN: CPT

## 2024-08-30 PROCEDURE — 80076 HEPATIC FUNCTION PANEL: CPT

## 2024-08-30 PROCEDURE — 85610 PROTHROMBIN TIME: CPT

## 2024-08-30 PROCEDURE — 84478 ASSAY OF TRIGLYCERIDES: CPT

## 2024-08-30 PROCEDURE — 2500000004 HC RX 250 GENERAL PHARMACY W/ HCPCS (ALT 636 FOR OP/ED)

## 2024-08-30 PROCEDURE — 76705 ECHO EXAM OF ABDOMEN: CPT | Performed by: STUDENT IN AN ORGANIZED HEALTH CARE EDUCATION/TRAINING PROGRAM

## 2024-08-30 PROCEDURE — 80061 LIPID PANEL: CPT

## 2024-08-30 PROCEDURE — 80143 DRUG ASSAY ACETAMINOPHEN: CPT

## 2024-08-30 PROCEDURE — 85730 THROMBOPLASTIN TIME PARTIAL: CPT

## 2024-08-30 PROCEDURE — 83718 ASSAY OF LIPOPROTEIN: CPT

## 2024-08-30 PROCEDURE — 85025 COMPLETE CBC W/AUTO DIFF WBC: CPT

## 2024-08-30 PROCEDURE — 36415 COLL VENOUS BLD VENIPUNCTURE: CPT

## 2024-08-30 PROCEDURE — 82248 BILIRUBIN DIRECT: CPT

## 2024-08-30 PROCEDURE — 83690 ASSAY OF LIPASE: CPT

## 2024-08-30 PROCEDURE — 2500000001 HC RX 250 WO HCPCS SELF ADMINISTERED DRUGS (ALT 637 FOR MEDICARE OP)

## 2024-08-30 PROCEDURE — 83735 ASSAY OF MAGNESIUM: CPT

## 2024-08-30 PROCEDURE — 1100000001 HC PRIVATE ROOM DAILY

## 2024-08-30 RX ORDER — PANTOPRAZOLE SODIUM 40 MG/10ML
40 INJECTION, POWDER, LYOPHILIZED, FOR SOLUTION INTRAVENOUS DAILY
Status: DISCONTINUED | OUTPATIENT
Start: 2024-08-30 | End: 2024-08-31 | Stop reason: HOSPADM

## 2024-08-30 RX ORDER — ACETAMINOPHEN 325 MG/1
975 TABLET ORAL ONCE
Status: DISCONTINUED | OUTPATIENT
Start: 2024-08-30 | End: 2024-08-30

## 2024-08-30 RX ORDER — OXYCODONE HYDROCHLORIDE 5 MG/1
5 TABLET ORAL EVERY 6 HOURS PRN
Status: DISCONTINUED | OUTPATIENT
Start: 2024-08-30 | End: 2024-08-31 | Stop reason: HOSPADM

## 2024-08-30 ASSESSMENT — COGNITIVE AND FUNCTIONAL STATUS - GENERAL
DAILY ACTIVITIY SCORE: 24
MOBILITY SCORE: 24

## 2024-08-30 ASSESSMENT — PAIN - FUNCTIONAL ASSESSMENT: PAIN_FUNCTIONAL_ASSESSMENT: 0-10

## 2024-08-30 ASSESSMENT — PAIN DESCRIPTION - LOCATION: LOCATION: ABDOMEN

## 2024-08-30 ASSESSMENT — PAIN DESCRIPTION - DESCRIPTORS: DESCRIPTORS: SORE

## 2024-08-30 ASSESSMENT — PAIN SCALES - GENERAL
PAINLEVEL_OUTOF10: 4
PAINLEVEL_OUTOF10: 6
PAINLEVEL_OUTOF10: 1
PAINLEVEL_OUTOF10: 1

## 2024-08-30 NOTE — PROGRESS NOTES
Pharmacy Medication History Review    Lamberto Olea is a 23 y.o. male admitted for Epigastric pain. Pharmacy reviewed the patient's [id number] medications and allergies for accuracy.    The list below reflects the updated PTA list. Comments regarding how patient may be taking medications differently can be found in the Admit Orders Activity  None        The list below reflects the updated allergy list. Please review each documented allergy for additional clarification and justification.  Allergies  Reviewed by Bebeto Gutierrez on 8/29/2024   No Known Allergies         Patient accepts M2B at discharge. Pharmacy has been updated to [facility].    Sources used to complete the med history include   EPIC DISPENSE HISTORY  [facility] ALLERGY LIST   OARRS ( NONE )  PATIENT INTERVIEW   Medications ADDED:  N/A  Medications CHANGED:  N/A  Medications REMOVED:   N/A  Below are additional concerns with the patient's PTA list.  Patient states taking no home medications or supplements    Bebeto Gutierrez [facility]  Transitions of Care Pharmacy Technician  [facility]  Please reach out via [employer] Secure Chat for questions, or if no response call [employer] or [employer] “MedRec”

## 2024-08-30 NOTE — CONSULTS
"Nutrition Assessment      Reason for Assessment: Admission nursing screening    Lamberto Olea is a 23 y.o. male on day 1 of admission presenting with Epigastric pain.    # Acute pancreatitis likely idiopathic versus less likely hypertriglyceridemia       Plan for RUQ ultrasound to further evaluate for stones     Nutrition History:  Food and Nutrient History: Attempted x 2 to see patient, but without success. Patient in the bathroom both times.  Vitamin/Herbal Supplement Use: None noted per chart review.  Food Allergies/Intolerances:  unknown  GI Symptoms: Diarrhea, Nausea, Vomiting, and Abdominal pain  Oral Problems: Unknown       Anthropometrics:  Height: 162.6 cm (5' 4\")   Weight: 78 kg (171 lb 15.3 oz)   BMI (Calculated): 29.5  IBW/kg (Dietitian Calculated): 59.1 kg  Percent of IBW: 131.9 %       Weight History:   Wt Readings from Last 5 Encounters:   08/29/24 78 kg (171 lb 15.3 oz)   08/29/24 78 kg (171 lb 15.3 oz)     No wt hx available.    Weight Change %:  Weight History / % Weight Change: Unknown    Nutrition Focused Physical Exam Findings:  Patient unavailable.  Edema:  Edema: none  Physical Findings:  Skin: Negative    Nutrition Significant Labs:  CBC Trend:   Results from last 7 days   Lab Units 08/30/24  0956 08/29/24  1246 08/29/24  0127   WBC AUTO x10*3/uL 7.5 13.4* 12.3*   RBC AUTO x10*6/uL 4.76 5.52 5.30   HEMOGLOBIN g/dL 13.8 16.3 15.4   HEMATOCRIT % 41.6 46.4 43.9   MCV fL 87 84 83   PLATELETS AUTO x10*3/uL 237 287 282    , BMP Trend:   Results from last 7 days   Lab Units 08/30/24  0956 08/29/24  1246 08/29/24  0127   GLUCOSE mg/dL 95 106* 121*   CALCIUM mg/dL 9.0 9.4 9.5   SODIUM mmol/L 138 138 135*   POTASSIUM mmol/L 4.1 4.2 4.2   CO2 mmol/L 29 27 22   CHLORIDE mmol/L 102 101 102   BUN mg/dL 7 9 7   CREATININE mg/dL 0.78 0.76 0.72    , A1C:No results found for: \"HGBA1C\", BG POCT trend:    , Liver Function Trend:   Results from last 7 days   Lab Units 08/30/24  0956 08/29/24  1246 " 08/29/24  0127   ALK PHOS U/L 73 79 81   AST U/L 277* 422* 402*   ALT U/L 143* 186* 167*   BILIRUBIN TOTAL mg/dL 0.7 0.7 0.8    , Renal Lab Trend:   Results from last 7 days   Lab Units 08/30/24  0956 08/29/24  1246 08/29/24  0127   POTASSIUM mmol/L 4.1 4.2 4.2   PHOSPHORUS mg/dL 2.8  --   --    SODIUM mmol/L 138 138 135*   MAGNESIUM mg/dL 1.84  --   --    EGFR mL/min/1.73m*2 >90 >90 >90   BUN mg/dL 7 9 7   CREATININE mg/dL 0.78 0.76 0.72      Nutrition Specific Medications:    Scheduled medications  pantoprazole, 40 mg, intravenous, Daily      Continuous medications     PRN medications  PRN medications: ondansetron, oxyCODONE, polyethylene glycol    I/O:   Last BM Date: 08/29/24;      Dietary Orders (From admission, onward)       Start     Ordered    08/29/24 1806  Adult diet Regular  Diet effective now        Question:  Diet type  Answer:  Regular    08/29/24 1805                     Estimated Needs:   Total Energy Estimated Needs (kCal): 2100 kCal  Method for Estimating Needs: 78 kg / 27 kcal  Total Protein Estimated Needs (g): 85 g  Method for Estimating Needs: 78 kg / 1.1 g              Nutrition Diagnosis        Nutrition Diagnosis  Patient has Nutrition Diagnosis: Yes  Diagnosis Status (1): New  Nutrition Diagnosis 1: Altered GI function  Related to (1): unknown etiology  As Evidenced by (1): nausea, vomiting, diarrhea and abdominal pain  Additional Nutrition Diagnosis: Diagnosis 2  Diagnosis Status (2): New  Nutrition Diagnosis 2: Inadequate oral intake  Related to (2): GI illness  As Evidenced by (2): consuming minimal amounts of food (< 50% of meals).       Nutrition Interventions/Recommendations         Nutrition Prescription:  Individualized Nutrition Prescription Provided for : Diet changed to Low Fat (pancreatitis)  Ensure Clear ordered (240 kcal and 8 g protein) TID.        Nutrition Interventions:   Interventions: Medical food supplement  Medical Food Supplement: Commercial beverage  Goal: Drink >/=  2  Ensure a day         Nutrition Education:          Nutrition Monitoring and Evaluation   Food/Nutrient Related History Monitoring  Monitoring and Evaluation Plan: Energy intake  Criteria: >/= 75% of meals/supplements    Body Composition/Growth/Weight History  Monitoring and Evaluation Plan: Weight  Criteria: Stable weight    Biochemical Data, Medical Tests and Procedures  Monitoring and Evaluation Plan: Electrolyte/renal panel, Glucose/endocrine profile  Criteria: Labs wnl              Time Spent (min): 30 minutes

## 2024-08-30 NOTE — CARE PLAN
The patient's goals for the shift include Plan to go home after pain control achieved    The clinical goals for the shift include Pain control to level of 1-3 by the end of the shift    Over the shift, the patient did not make progress toward the following goals. Barriers to progression include   . Recommendations to address these barriers include   .

## 2024-08-30 NOTE — PROGRESS NOTES
Daily Progress Note    Lamberto Olea is a 23 y.o. male on day 1 of admission presenting with Epigastric pain.    Subjective   No acute overnight events. Patient seen and examined at bedside. The patient slept well overnight. Reports continued pain with both solid and liquid foods. He has tried both yogurt and water, both of which are giving him pain. Patient denies other CP/SOB. No N/V/D. Denies other complaints today. States that his pain is well-controlled on oxycodone 5mg.     Objective   Vitals: I/O:   Vitals:    08/30/24 0608   BP: 105/57   Pulse: 62   Resp:    Temp: 36.1 °C (97 °F)   SpO2:         Temp  Min: 36.1 °C (97 °F)  Max: 36.5 °C (97.7 °F)  Pulse  Min: 62  Max: 88  BP  Min: 105/57  Max: 148/105  Resp  Min: 16  Max: 20  SpO2  Min: 97 %  Max: 100 %   Intake/Output Summary (Last 24 hours) at 8/30/2024 0942  Last data filed at 8/29/2024 2301  Gross per 24 hour   Intake 3747 ml   Output --   Net 3747 ml        Net IO Since Admission: 3,747 mL [08/30/24 0942]      Physical Exam  Constitutional:       General: He is not in acute distress.     Appearance: Normal appearance. He is not ill-appearing.   HENT:      Head: Normocephalic and atraumatic.   Eyes:      Conjunctiva/sclera: Conjunctivae normal.      Pupils: Pupils are equal, round, and reactive to light.   Cardiovascular:      Rate and Rhythm: Normal rate and regular rhythm.      Pulses: Normal pulses.      Heart sounds: Normal heart sounds. No murmur heard.     No friction rub. No gallop.   Pulmonary:      Effort: Pulmonary effort is normal. No respiratory distress.      Breath sounds: Normal breath sounds. No wheezing.   Abdominal:      General: Abdomen is flat. There is no distension.      Palpations: Abdomen is soft. There is no mass.      Tenderness: There is abdominal tenderness (bilateral upper quadrants / epigastric). There is no guarding or rebound.   Musculoskeletal:         General: No swelling or deformity. Normal range of motion.    Skin:     General: Skin is warm and dry.   Neurological:      General: No focal deficit present.      Mental Status: He is alert and oriented to person, place, and time. Mental status is at baseline.   Psychiatric:         Mood and Affect: Mood normal.         Behavior: Behavior normal.       Medications:  Scheduled Medications PRN Medications   pantoprazole, 40 mg, intravenous, Daily      PRN medications: ondansetron, oxyCODONE, polyethylene glycol      Relevant Results:  Results from last 7 days   Lab Units 08/29/24  1246 08/29/24  0127   WBC AUTO x10*3/uL 13.4* 12.3*   HEMOGLOBIN g/dL 16.3 15.4   HEMATOCRIT % 46.4 43.9   PLATELETS AUTO x10*3/uL 287 282            Results from last 7 days   Lab Units 08/29/24  1246 08/29/24  0127   SODIUM mmol/L 138 135*   POTASSIUM mmol/L 4.2 4.2   CHLORIDE mmol/L 101 102   CO2 mmol/L 27 22   BUN mg/dL 9 7   CREATININE mg/dL 0.76 0.72   CALCIUM mg/dL 9.4 9.5     Results from last 7 days   Lab Units 08/29/24  1246 08/29/24  0127   ALK PHOS U/L 79 81   BILIRUBIN TOTAL mg/dL 0.7 0.8   PROTEIN TOTAL g/dL 7.5 7.0   ALT U/L 186* 167*   AST U/L 422* 402*      Results from last 7 days   Lab Units 08/29/24  1616   APTT seconds 24*   INR  1.1      CT abdomen pelvis w IV contrast   Final Result   Unremarkable CT scan the abdomen and pelvis.   No acute findings.   Signed by Caesar Allen MD      US right upper quadrant    (Results Pending)          Assessment/Plan      Lamberto Olea is a 23 y.o. male with no significant past medical history who presented to [facility] for complaints of epigastric abdominal pain, nausea, vomiting, and loose stools over the past 2 days. Patient was recently evaluated at [facility] ED yesterday for similar complaints. Patient is having persistent epigastric pain upon palpation and with food/liquids.  Labs significant for slight leukocytosis, significant increase in lipase from 18 to 3000s, and transaminitis with AST in 400s and ALT in 100s.  CT abdomen pelvis unremarkable without stones. Acute pancreatitis remains high on the differential diagnosis. AST>ALT 2:1 might indicate an alcoholic etiology, however, patient denying alcohol use and EtOH negative. No known family history of pancreatitis. No history of gallstones or previous episodes of pancreatitis. At this time, etiology remains likely idiopathic/viral vs. less likely hypertriglyceridemia.     Updates (08/30/24):  Plan for RUQ ultrasound to further evaluate for stones  Follow up repeat lipase and lipid panel  Added pantoprazole 40 mg IV daily    # Acute pancreatitis likely idiopathic versus less likely hypertriglyceridemia  :: CT abdomen pelvis 8/29 unremarkable in the ED, no stones.  :: Most common causes of pancreatitis include alcohol and gallstones. Alcohol less likely given patient denying history. Gallstones also less likely given negative CT.  :: S/p 1 L LR in the ED, additional 1 L LR before transfer to the floor.  Ordered RUQ ultrasound to further evaluate for stones  Added pantoprazole 40 mg IV daily  Follow up repeat lipase and lipid panel  Pain currently well-controlled on oxycodone 5 mg q6h PRN  Continue regular diet, encouraged patient to resume PO intake as tolerated.  IV Zofran 4 mg q6 hours PRN  Will schedule follow-up with Dr. Krissy Griggs in one month post-discharge.     # Transaminitis  :: AST>ALT 2:1 might indicate an alcoholic etiology for pancreatitis, however, patient denying alcohol use.   :: EtOH level <10  :: Hepatitis panel unremarkable  Follow up [facility]     # Leukocytosis  :: Likely reactive no signs and symptoms of infection at this time.  Will continue to monitor    IVF: PRN  Electrolytes: K>4, P>3, Mg>2  Access: PIV    Diet: Adult diet Regular   DVT Ppx: Not indicated, encourage ambulation  GI Ppx: Not indicated  Bowel regimen: MiraLAX PRN  Pain management: oxycodone 5 mg q6h PRN    Emergency Contact: Extended Emergency Contact Information  Primary Emergency Contact:  ERVIN GONZALES  Mobile Phone: [phone]  Relation: Friend  Preferred language: English   needed? No   Code status: Full Code (confirmed on admission)  Disposition: Admitted pending further workup    Shadi Lutz MD   PGY-1 Internal Medicine

## 2024-08-31 VITALS
HEIGHT: 64 IN | RESPIRATION RATE: 16 BRPM | BODY MASS INDEX: 29.36 KG/M2 | OXYGEN SATURATION: 97 % | WEIGHT: 171.96 LBS | SYSTOLIC BLOOD PRESSURE: 129 MMHG | HEART RATE: 86 BPM | DIASTOLIC BLOOD PRESSURE: 80 MMHG | TEMPERATURE: 98.1 F

## 2024-08-31 DIAGNOSIS — K85.90 ACUTE PANCREATITIS, UNSPECIFIED COMPLICATION STATUS, UNSPECIFIED PANCREATITIS TYPE (HHS-HCC): Primary | ICD-10-CM

## 2024-08-31 LAB
ALBUMIN SERPL BCP-MCNC: 4.2 G/DL (ref 3.4–5)
ALP SERPL-CCNC: 78 U/L (ref 33–120)
ALT SERPL W P-5'-P-CCNC: 131 U/L (ref 10–52)
ANION GAP SERPL CALC-SCNC: 13 MMOL/L (ref 10–20)
APTT PPP: 30 SECONDS (ref 27–38)
AST SERPL W P-5'-P-CCNC: 192 U/L (ref 9–39)
BASOPHILS # BLD AUTO: 0.02 X10*3/UL (ref 0–0.1)
BASOPHILS NFR BLD AUTO: 0.2 %
BILIRUB DIRECT SERPL-MCNC: 0.2 MG/DL (ref 0–0.3)
BILIRUB SERPL-MCNC: 0.7 MG/DL (ref 0–1.2)
BUN SERPL-MCNC: 7 MG/DL (ref 6–23)
CALCIUM SERPL-MCNC: 9.6 MG/DL (ref 8.6–10.6)
CHLORIDE SERPL-SCNC: 102 MMOL/L (ref 98–107)
CO2 SERPL-SCNC: 28 MMOL/L (ref 21–32)
CREAT SERPL-MCNC: 0.8 MG/DL (ref 0.5–1.3)
EGFRCR SERPLBLD CKD-EPI 2021: >90 ML/MIN/1.73M*2
EOSINOPHIL # BLD AUTO: 0.13 X10*3/UL (ref 0–0.7)
EOSINOPHIL NFR BLD AUTO: 1.5 %
ERYTHROCYTE [DISTWIDTH] IN BLOOD BY AUTOMATED COUNT: 13 % (ref 11.5–14.5)
GLUCOSE SERPL-MCNC: 76 MG/DL (ref 74–99)
HCT VFR BLD AUTO: 43.4 % (ref 41–52)
HGB BLD-MCNC: 14.4 G/DL (ref 13.5–17.5)
IMM GRANULOCYTES # BLD AUTO: 0.03 X10*3/UL (ref 0–0.7)
IMM GRANULOCYTES NFR BLD AUTO: 0.4 % (ref 0–0.9)
INR PPP: 1.1 (ref 0.9–1.1)
LYMPHOCYTES # BLD AUTO: 1.95 X10*3/UL (ref 1.2–4.8)
LYMPHOCYTES NFR BLD AUTO: 23 %
MAGNESIUM SERPL-MCNC: 1.93 MG/DL (ref 1.6–2.4)
MCH RBC QN AUTO: 28.6 PG (ref 26–34)
MCHC RBC AUTO-ENTMCNC: 33.2 G/DL (ref 32–36)
MCV RBC AUTO: 86 FL (ref 80–100)
MONOCYTES # BLD AUTO: 0.85 X10*3/UL (ref 0.1–1)
MONOCYTES NFR BLD AUTO: 10 %
NEUTROPHILS # BLD AUTO: 5.5 X10*3/UL (ref 1.2–7.7)
NEUTROPHILS NFR BLD AUTO: 64.9 %
NRBC BLD-RTO: 0 /100 WBCS (ref 0–0)
PHOSPHATE SERPL-MCNC: 3.1 MG/DL (ref 2.5–4.9)
PLATELET # BLD AUTO: 253 X10*3/UL (ref 150–450)
POTASSIUM SERPL-SCNC: 3.9 MMOL/L (ref 3.5–5.3)
PROT SERPL-MCNC: 6.8 G/DL (ref 6.4–8.2)
PROTHROMBIN TIME: 12.3 SECONDS (ref 9.8–12.8)
RBC # BLD AUTO: 5.04 X10*6/UL (ref 4.5–5.9)
SODIUM SERPL-SCNC: 139 MMOL/L (ref 136–145)
WBC # BLD AUTO: 8.5 X10*3/UL (ref 4.4–11.3)

## 2024-08-31 PROCEDURE — 80053 COMPREHEN METABOLIC PANEL: CPT

## 2024-08-31 PROCEDURE — 84100 ASSAY OF PHOSPHORUS: CPT

## 2024-08-31 PROCEDURE — 85730 THROMBOPLASTIN TIME PARTIAL: CPT

## 2024-08-31 PROCEDURE — 85025 COMPLETE CBC W/AUTO DIFF WBC: CPT

## 2024-08-31 PROCEDURE — 80048 BASIC METABOLIC PNL TOTAL CA: CPT

## 2024-08-31 PROCEDURE — 85610 PROTHROMBIN TIME: CPT

## 2024-08-31 PROCEDURE — 83735 ASSAY OF MAGNESIUM: CPT

## 2024-08-31 PROCEDURE — 82248 BILIRUBIN DIRECT: CPT

## 2024-08-31 PROCEDURE — 99238 HOSP IP/OBS DSCHRG MGMT 30/<: CPT

## 2024-08-31 PROCEDURE — 36415 COLL VENOUS BLD VENIPUNCTURE: CPT

## 2024-08-31 PROCEDURE — 2500000004 HC RX 250 GENERAL PHARMACY W/ HCPCS (ALT 636 FOR OP/ED)

## 2024-08-31 ASSESSMENT — COGNITIVE AND FUNCTIONAL STATUS - GENERAL
MOBILITY SCORE: 24
DAILY ACTIVITIY SCORE: 24

## 2024-08-31 ASSESSMENT — PAIN - FUNCTIONAL ASSESSMENT
PAIN_FUNCTIONAL_ASSESSMENT: 0-10
PAIN_FUNCTIONAL_ASSESSMENT: 0-10

## 2024-08-31 ASSESSMENT — PAIN SCALES - GENERAL
PAINLEVEL_OUTOF10: 0 - NO PAIN
PAINLEVEL_OUTOF10: 0 - NO PAIN

## 2024-08-31 NOTE — NURSING NOTE
Patient discharged home with no needs. Patient aware to follow up with new PCP and Gastroenterologist outpatient. Referrals sent. Patient provided with phone number to schedule if he does not hear from anyone in 2-3 business days. AVS reviewed with patient. All questions answered. IV removed.

## 2024-08-31 NOTE — DISCHARGE INSTRUCTIONS
Dear Mr Olea,    You were admitted to the hospital for abdominal pain, this was from a condition called pancreatitis. You were treated with some stomach rest, fluids, and pain medicine and fortunately your pain got better. This was most likely caused by a gall bladder stone which most likely has passed since your symptoms got better quickly. We would like you to see a primary care doctor after you leave the hospital to follow up on this hospitalization and to address any other health needs you have in the future. We would also like you to see the gastroenterology team here in about a month to follow up as well. We have put in requests for these to be scheduled. If you do not hear back in 2-3 business days, please call [phone] to schedule the appointments.    We wish you the best,    Your  medicine team

## 2024-08-31 NOTE — CARE PLAN
The patient's goals for the shift include Plan to go home after pain control achieved    The clinical goals for the shift include pain control to be under 3/10 and pt to tolerate eating    Over the shift, the patient did not make progress toward the following goals. Barriers to progression include did not order dinner before 7pm. Recommendations to address these barriers include reviewed meal plan and ordering.  Went and got food for him at cafeteria.

## 2024-08-31 NOTE — DISCHARGE SUMMARY
Discharge Diagnosis  Acute pancreatitis    Issues Requiring Follow-Up  Pending labs as below  Consider outpatient MRCP to assess anatomy    Test Results Pending At Discharge  Pending Labs       Order Current Status    Hepatitis Be Antibody In process    Hepatitis Be Antigen In process    Phosphatidylethanol (PEth), Whole Blood, Quantitative In process            Hospital Course  Lamberto Olea is a 23 y.o. male with no significant past medical history who presented to [facility] for complaints of epigastric abdominal pain, nausea, vomiting, and loose stools over the past 2 days. Patient was recently evaluated at [facility] ED yesterday for similar complaints. Patient is having persistent epigastric pain upon palpation and with food/liquids.  Labs significant for slight leukocytosis, significant increase in lipase from 18 to 3000s, and transaminitis with AST an 400s and ALT in 100s. CT abdomen pelvis unremarkable. Diagnosed with Acute pancreatitis based on symptoms and lipase. Patient denied etoh use, though AST>ALT 2:1 might indicate an alcoholic etiology and peth was sent. Stone also possible, though RUQ US was obtained and unremarkable. No known family history of pancreatitis. No history of gallstones or previous episodes of pancreatitis, triglycerides WNL. Repeat lipase 64, which could be seen in setting of passed stone and resolved compression. His symptoms improved markedly and he was discharged 8/31 in improved condition.     Follow up items:  -PETH sent and pending  -HEPb antibody and antigen pending  -PCP followup requested at time of discharge  -GI followup requested at time of discharge  -patient instructed to schedule appointments when able  -May benefit from outpatient MRCP to further evaluate anatomy    Pertinent Physical Exam At Time of Discharge  Physical Exam  Constitutional:       General: He is not in acute distress.  HENT:      Mouth/Throat:      Mouth: Mucous membranes are  moist.      Pharynx: No oropharyngeal exudate.   Eyes:      General: No scleral icterus.     Extraocular Movements: Extraocular movements intact.      Pupils: Pupils are equal, round, and reactive to light.   Cardiovascular:      Rate and Rhythm: Normal rate and regular rhythm.      Heart sounds: No murmur heard.  Pulmonary:      Effort: Pulmonary effort is normal. No respiratory distress.      Breath sounds: No wheezing, rhonchi or rales.   Abdominal:      General: Abdomen is flat. There is no distension.      Palpations: Abdomen is soft.      Tenderness: There is no abdominal tenderness. There is no guarding.   Musculoskeletal:      Right lower leg: No edema.      Left lower leg: No edema.   Skin:     General: Skin is warm and dry.   Neurological:      General: No focal deficit present.      Mental Status: He is alert and oriented to person, place, and time.         Home Medications     Medication List      You have not been prescribed any medications.       Outpatient Follow-Up  No future appointments.    Frankie Lozada MD PhD

## 2024-08-31 NOTE — CARE PLAN
The patient's goals for the shift include Plkan discharge home    The clinical goals for the shift include Plan for discharge if pain under control by the end of the shift    Over the shift, the patient did not make progress toward the following goals. Barriers to progression include   . Recommendations to address these barriers include   .

## 2024-09-01 LAB
HBV E AB SERPL QL IA: NEGATIVE
HBV E AG SERPL QL IA: NEGATIVE
LABORATORY REPORT: NORMAL
PETH INTERPRETATION: NORMAL
PLPETH BLD-MCNC: <10 NG/ML
POPETH BLD-MCNC: <10 NG/ML

## 2024-10-02 NOTE — PROGRESS NOTES
PCP: No primary care provider on file.    Referring provider: Jeremy Pablo MD    Lamberto Olea is a 23 y.o. male with no known PMH presenting for post hospital discharge follow up.    History Of Present Illness:  Patient was admitted in August 2024 with acute pancreatitis of unknown etiology.    No fhx of CRC.     Denies any nausea, vomiting, abdominal pain, dysphagia, anorexia, heartburn, regurgitation, change in bowel habits, early satiety, unintentional weight loss, fatigue, hematochezia, hematemesis, melena or fevers/chills.    12-point ROS was reviewed and is otherwise negative.    Pertinent Procedures:  ***    Pertinent GI Imaging:  RUQ US 8/30/24  FINDINGS:  LIVER:  The liver measures 14.1 cm and is grossly unremarkable and free of any focal lesions.  GALLBLADDER:  The gallbladder is nondistended, and demonstrates no evidence of gallstones, wall thickening or surrounding fluid. The gallbladder wall thickness is 0.2 cm. Sonographic Heredia's sign is negative.  BILE DUCTS:  No evidence of intra or extrahepatic biliary dilatation is identified; the common bile duct measures 0.4 cm.  PANCREAS:  The pancreas is poorly visualized due to overlying bowel gas.  RIGHT KIDNEY:  The right kidney measures 9.6 cm in length. The renal cortical echogenicity and thickness are within normal limit.  No hydronephrosis or renal calculi are seen.    CT abdomen and pelvis 8/29/24  LIVER:  No hepatomegaly.  Smooth surface contour.  Normal attenuation.  BILE DUCTS:  No intrahepatic or extrahepatic biliary ductal dilatation.  GALLBLADDER:  The gallbladder is unremarkable.  STOMACH:  There is thickening of the wall the stomach but this is most likely related to lack of distention.  PANCREAS:  No masses or ductal dilatation.    SH:  -Lives with ***  -Occupation: ***  -EtOH: ***  -Recreational drugs: ***  -Tobacco: ***    Past Medical History:  He has no past medical history on file.    Home Medications:  No current  "outpatient medications     Surgical History:  He has no past surgical history on file.     Social History:  He reports that he has never smoked. He has never used smokeless tobacco. No history on file for alcohol use and drug use.    Family History:  No family history on file.     Allergies:  Patient has no known allergies.    OBJECTIVE:  Vital Signs:  There were no vitals taken for this visit.    Physical Exam:  General: Patient is in NAD.  Neuro: A and O x 3, CN 1-12 grossly intact, no asterixis.  HEENT: moist mucosa, sclera anicteric, EOMI  CV: regular rate  Pulm: nonlabored breathing on room air  Abd: Soft, nontender/ nondistended, +BS, no rebound or guarding, no hepatosplenomegaly.  Ext: Warm and well-perfused.  Rectal: ***   Psych: Appropriate mood and behavior.    Labs:  Lab Results   Component Value Date    WBC 8.5 08/31/2024    HGB 14.4 08/31/2024    HCT 43.4 08/31/2024    MCV 86 08/31/2024     08/31/2024     Lab Results   Component Value Date    GLUCOSE 76 08/31/2024    CALCIUM 9.6 08/31/2024     08/31/2024    K 3.9 08/31/2024    CO2 28 08/31/2024     08/31/2024    BUN 7 08/31/2024    CREATININE 0.80 08/31/2024     Lab Results   Component Value Date     (H) 08/31/2024     (H) 08/31/2024    ALKPHOS 78 08/31/2024    BILITOT 0.7 08/31/2024     No results found for: \"IRON\", \"TIBC\", \"FERRITIN\"  No results found for: \"KUIPBWNW04\"  No results found for: \"FOLATE\"  No results found for: \"TSH\", \"U5HFYXR\", \"F9MDHSE\", \"THYROIDPAB\"    Assessment/Plan     ***  Health maintenance  -Last colonoscopy:  -Hepatitis C screening (all adults 1 time between 18 and 79):  -[place] screening (Male patient with GERD symptoms):    RTC in *** .    Case seen, examined and discussed with attending, ***.    Krissy Griggs MD    "

## 2024-10-03 ENCOUNTER — APPOINTMENT (OUTPATIENT)
Dept: GASTROENTEROLOGY | Facility: HOSPITAL | Age: 24
End: 2024-10-03
Payer: COMMERCIAL